# Patient Record
Sex: FEMALE | Race: WHITE | NOT HISPANIC OR LATINO | URBAN - METROPOLITAN AREA
[De-identification: names, ages, dates, MRNs, and addresses within clinical notes are randomized per-mention and may not be internally consistent; named-entity substitution may affect disease eponyms.]

---

## 2017-09-08 PROBLEM — 238136002 MORBID OBESITY: Status: ACTIVE | Noted: 2017-09-08

## 2017-09-08 PROBLEM — 68496003 POLYP OF COLON: Status: ACTIVE | Noted: 2017-09-08

## 2022-04-30 ENCOUNTER — TELEPHONE ENCOUNTER (OUTPATIENT)
Dept: URBAN - METROPOLITAN AREA CLINIC 121 | Facility: CLINIC | Age: 72
End: 2022-04-30

## 2022-05-01 ENCOUNTER — TELEPHONE ENCOUNTER (OUTPATIENT)
Dept: URBAN - METROPOLITAN AREA CLINIC 121 | Facility: CLINIC | Age: 72
End: 2022-05-01

## 2022-05-01 RX ORDER — FLUTICASONE PROPIONATE AND SALMETEROL 50; 250 UG/1; UG/1
POWDER RESPIRATORY (INHALATION)
Status: ACTIVE | COMMUNITY
Start: 2017-09-08

## 2022-05-01 RX ORDER — CETIRIZINE HYDROCHLORIDE 10 MG/1
CAPSULE, LIQUID FILLED ORAL
Status: ACTIVE | COMMUNITY
Start: 2017-09-08

## 2022-05-01 RX ORDER — MONTELUKAST SODIUM 10 MG/1
TABLET, FILM COATED ORAL
Status: ACTIVE | COMMUNITY
Start: 2006-07-14

## 2022-05-01 RX ORDER — EZETIMIBE 10 MG/1
TABLET ORAL
Status: ACTIVE | COMMUNITY
Start: 2017-09-08

## 2022-05-01 RX ORDER — LEVOTHYROXINE SODIUM 150 MCG
TABLET ORAL
Status: ACTIVE | COMMUNITY
Start: 2006-07-14

## 2022-05-01 RX ORDER — PANTOPRAZOLE 20 MG/1
TABLET, DELAYED RELEASE ORAL
Status: ACTIVE | COMMUNITY
Start: 2017-09-08

## 2023-04-03 ENCOUNTER — OFFICE VISIT (OUTPATIENT)
Dept: URBAN - METROPOLITAN AREA CLINIC 27 | Facility: CLINIC | Age: 73
End: 2023-04-03

## 2023-04-12 ENCOUNTER — WEB ENCOUNTER (OUTPATIENT)
Dept: URBAN - METROPOLITAN AREA CLINIC 27 | Facility: CLINIC | Age: 73
End: 2023-04-12

## 2023-04-12 ENCOUNTER — OFFICE VISIT (OUTPATIENT)
Dept: URBAN - METROPOLITAN AREA CLINIC 27 | Facility: CLINIC | Age: 73
End: 2023-04-12
Payer: MEDICARE

## 2023-04-12 ENCOUNTER — DASHBOARD ENCOUNTERS (OUTPATIENT)
Age: 73
End: 2023-04-12

## 2023-04-12 VITALS
HEART RATE: 69 BPM | SYSTOLIC BLOOD PRESSURE: 142 MMHG | RESPIRATION RATE: 17 BRPM | BODY MASS INDEX: 38.64 KG/M2 | HEIGHT: 62 IN | WEIGHT: 210 LBS | DIASTOLIC BLOOD PRESSURE: 72 MMHG

## 2023-04-12 DIAGNOSIS — E11.9 TYPE 2 DIABETES MELLITUS WITHOUT COMPLICATION, UNSPECIFIED WHETHER LONG TERM INSULIN USE: ICD-10-CM

## 2023-04-12 DIAGNOSIS — J45.909 ASTHMA, UNSPECIFIED ASTHMA SEVERITY, UNSPECIFIED WHETHER COMPLICATED, UNSPECIFIED WHETHER PERSISTENT: ICD-10-CM

## 2023-04-12 DIAGNOSIS — Z86.010 PERSONAL HISTORY OF COLONIC POLYPS: ICD-10-CM

## 2023-04-12 PROBLEM — 195967001: Status: ACTIVE | Noted: 2023-04-12

## 2023-04-12 PROBLEM — 313436004: Status: ACTIVE | Noted: 2023-04-12

## 2023-04-12 PROBLEM — 428283002: Status: ACTIVE | Noted: 2023-04-12

## 2023-04-12 PROCEDURE — 99204 OFFICE O/P NEW MOD 45 MIN: CPT | Performed by: INTERNAL MEDICINE

## 2023-04-12 RX ORDER — MONTELUKAST SODIUM 10 MG/1
TABLET, FILM COATED ORAL
Status: ACTIVE | COMMUNITY
Start: 2006-07-14

## 2023-04-12 RX ORDER — EZETIMIBE 10 MG/1
TABLET ORAL
Status: ACTIVE | COMMUNITY
Start: 2017-09-08

## 2023-04-12 RX ORDER — CETIRIZINE HYDROCHLORIDE 10 MG/1
CAPSULE, LIQUID FILLED ORAL
Status: ACTIVE | COMMUNITY
Start: 2017-09-08

## 2023-04-12 RX ORDER — FLUTICASONE PROPIONATE AND SALMETEROL 50; 250 UG/1; UG/1
POWDER RESPIRATORY (INHALATION)
Status: ACTIVE | COMMUNITY
Start: 2017-09-08

## 2023-04-12 RX ORDER — PANTOPRAZOLE 20 MG/1
TABLET, DELAYED RELEASE ORAL
Status: ACTIVE | COMMUNITY
Start: 2017-09-08

## 2023-04-12 RX ORDER — LEVOTHYROXINE SODIUM 150 MCG
TABLET ORAL
Status: ACTIVE | COMMUNITY
Start: 2006-07-14

## 2023-04-12 NOTE — HPI-ZZZTODAY'S VISIT
Ms. Miles is a 73-year-old female patient of Dr. Paniagua presenting for surveillance colonoscopy. She has intermittent loose stools (2-3x/month) which she believes may be related to her metformin. No rectal bleeding. She says thyroid levels are well controlled. She has h/o asthma, was having significant issues spring 2022 but now well controlled; she is on a biologic called Transpire for this, does not use suppelemental O2. She had a presurgical cardic eval 1 yr ago with overtly normal EKG, echo, nuclear stress test. . Colonoscopy 2017: 2 small polyps (1 TA); recall 5 years

## 2023-06-02 ENCOUNTER — CLAIMS CREATED FROM THE CLAIM WINDOW (OUTPATIENT)
Dept: URBAN - METROPOLITAN AREA SURGERY CENTER 7 | Facility: SURGERY CENTER | Age: 73
End: 2023-06-02
Payer: MEDICARE

## 2023-06-02 ENCOUNTER — CLAIMS CREATED FROM THE CLAIM WINDOW (OUTPATIENT)
Dept: URBAN - METROPOLITAN AREA CLINIC 4 | Facility: CLINIC | Age: 73
End: 2023-06-02
Payer: MEDICARE

## 2023-06-02 DIAGNOSIS — D12.4 ADENOMA OF DESCENDING COLON: ICD-10-CM

## 2023-06-02 DIAGNOSIS — K63.89 APPENDICITIS EPIPLOICA: ICD-10-CM

## 2023-06-02 DIAGNOSIS — Z86.010 ADENOMAS PERSONAL HISTORY OF COLONIC POLYPS: ICD-10-CM

## 2023-06-02 DIAGNOSIS — K63.89 OTHER SPECIFIED DISEASES OF INTESTINE: ICD-10-CM

## 2023-06-02 PROCEDURE — G8907 PT DOC NO EVENTS ON DISCHARG: HCPCS | Performed by: INTERNAL MEDICINE

## 2023-06-02 PROCEDURE — 45384 COLONOSCOPY W/LESION REMOVAL: CPT | Performed by: INTERNAL MEDICINE

## 2023-06-02 PROCEDURE — 45385 COLONOSCOPY W/LESION REMOVAL: CPT | Performed by: INTERNAL MEDICINE

## 2023-06-02 PROCEDURE — 88305 TISSUE EXAM BY PATHOLOGIST: CPT | Performed by: PATHOLOGY

## 2023-06-02 RX ORDER — PANTOPRAZOLE 20 MG/1
TABLET, DELAYED RELEASE ORAL
Status: ACTIVE | COMMUNITY
Start: 2017-09-08

## 2023-06-02 RX ORDER — EZETIMIBE 10 MG/1
TABLET ORAL
Status: ACTIVE | COMMUNITY
Start: 2017-09-08

## 2023-06-02 RX ORDER — LEVOTHYROXINE SODIUM 150 MCG
TABLET ORAL
Status: ACTIVE | COMMUNITY
Start: 2006-07-14

## 2023-06-02 RX ORDER — FLUTICASONE PROPIONATE AND SALMETEROL 50; 250 UG/1; UG/1
POWDER RESPIRATORY (INHALATION)
Status: ACTIVE | COMMUNITY
Start: 2017-09-08

## 2023-06-02 RX ORDER — MONTELUKAST SODIUM 10 MG/1
TABLET, FILM COATED ORAL
Status: ACTIVE | COMMUNITY
Start: 2006-07-14

## 2023-06-02 RX ORDER — CETIRIZINE HYDROCHLORIDE 10 MG/1
CAPSULE, LIQUID FILLED ORAL
Status: ACTIVE | COMMUNITY
Start: 2017-09-08

## 2025-01-24 ENCOUNTER — OFFICE VISIT (OUTPATIENT)
Dept: URBAN - METROPOLITAN AREA CLINIC 27 | Facility: CLINIC | Age: 75
End: 2025-01-24
Payer: COMMERCIAL

## 2025-01-24 VITALS
HEIGHT: 62 IN | DIASTOLIC BLOOD PRESSURE: 73 MMHG | BODY MASS INDEX: 37.91 KG/M2 | HEART RATE: 65 BPM | WEIGHT: 206 LBS | SYSTOLIC BLOOD PRESSURE: 122 MMHG

## 2025-01-24 DIAGNOSIS — K21.9 GERD: ICD-10-CM

## 2025-01-24 DIAGNOSIS — K63.5 POLYP OF COLON: ICD-10-CM

## 2025-01-24 DIAGNOSIS — G47.30 SLEEP APNEA, UNSPECIFIED: ICD-10-CM

## 2025-01-24 DIAGNOSIS — E66.3 OVERWEIGHT: ICD-10-CM

## 2025-01-24 DIAGNOSIS — J45.909 UNSPECIFIED ASTHMA, UNCOMPLICATED: ICD-10-CM

## 2025-01-24 DIAGNOSIS — E03.9 HYPOTHYROIDISM, UNSPECIFIED: ICD-10-CM

## 2025-01-24 DIAGNOSIS — Z86.0100 HISTORY OF COLON POLYPS: ICD-10-CM

## 2025-01-24 PROCEDURE — 99214 OFFICE O/P EST MOD 30 MIN: CPT | Performed by: INTERNAL MEDICINE

## 2025-01-24 RX ORDER — SITAGLIPTIN 100 MG/1
TAKE 1 TABLET BY MOUTH EVERY DAY TABLET, FILM COATED ORAL
Qty: 90 EACH | Refills: 0 | Status: ACTIVE | COMMUNITY

## 2025-01-24 RX ORDER — FLUTICASONE PROPIONATE AND SALMETEROL 50; 250 UG/1; UG/1
POWDER RESPIRATORY (INHALATION)
Status: DISCONTINUED | COMMUNITY
Start: 2017-09-08

## 2025-01-24 RX ORDER — LEVOCETIRIZINE DIHYDROCHLORIDE 5 MG/1
1 TABLET IN THE EVENING TABLET, FILM COATED ORAL ONCE A DAY
Status: ACTIVE | COMMUNITY

## 2025-01-24 RX ORDER — CETIRIZINE HYDROCHLORIDE 10 MG/1
CAPSULE, LIQUID FILLED ORAL
Status: DISCONTINUED | COMMUNITY
Start: 2017-09-08

## 2025-01-24 RX ORDER — PANTOPRAZOLE 20 MG/1
TABLET, DELAYED RELEASE ORAL
Status: DISCONTINUED | COMMUNITY
Start: 2017-09-08

## 2025-01-24 RX ORDER — MONTELUKAST SODIUM 10 MG/1
TABLET, FILM COATED ORAL
Status: DISCONTINUED | COMMUNITY
Start: 2006-07-14

## 2025-01-24 RX ORDER — PANTOPRAZOLE SODIUM 40 MG/1
1 TABLET TABLET, DELAYED RELEASE ORAL TWICE DAILY
Status: ACTIVE | COMMUNITY

## 2025-01-24 RX ORDER — METFORMIN HYDROCHLORIDE 500 MG/1
1 TABLET WITH A MEAL TABLET, FILM COATED ORAL ONCE A DAY
Status: ACTIVE | COMMUNITY

## 2025-01-24 RX ORDER — ATORVASTATIN CALCIUM 40 MG/1
1 TABLET TABLET, FILM COATED ORAL ONCE A DAY
Status: ACTIVE | COMMUNITY

## 2025-01-24 RX ORDER — GLIPIZIDE 10 MG/1
TAKE 1 TABLET BY MOUTH TWICE A DAY BEFORE MEALS TABLET ORAL
Qty: 180 EACH | Refills: 0 | Status: ACTIVE | COMMUNITY

## 2025-01-24 RX ORDER — LEVOTHYROXINE SODIUM 125 UG/1
1 CAPSULE IN THE MORNING ON AN EMPTY STOMACH CAPSULE ORAL ONCE A DAY
Status: ACTIVE | COMMUNITY

## 2025-01-24 RX ORDER — TEZEPELUMAB-EKKO 210 MG/1.9ML
AS DIRECTED INJECTION, SOLUTION SUBCUTANEOUS
Status: ACTIVE | COMMUNITY

## 2025-01-24 RX ORDER — EZETIMIBE 10 MG/1
TABLET ORAL
Status: DISCONTINUED | COMMUNITY
Start: 2017-09-08

## 2025-01-24 RX ORDER — LEVOTHYROXINE SODIUM 150 MCG
TABLET ORAL
Status: DISCONTINUED | COMMUNITY
Start: 2006-07-14

## 2025-01-24 NOTE — HPI-TODAY'S VISIT:
This is a 75-year-old female seen in consultation for her reflux.  She is taking Tapazole twice a day.  That has helped her asthma.  She is taking gummy fibers every day.  She last saw her pulmonologist in November.  She notes that sinus drainage also contributes to some of those symptoms.  She was placed on tespire and that has vastly improved her asthma.  She had a colonoscopy in 2023.  Fair prep with tubular adenoma 1 year follow-up was recommended.

## 2025-01-27 ENCOUNTER — LAB OUTSIDE AN ENCOUNTER (OUTPATIENT)
Dept: URBAN - METROPOLITAN AREA CLINIC 27 | Facility: CLINIC | Age: 75
End: 2025-01-27

## 2025-02-11 ENCOUNTER — OFFICE VISIT (OUTPATIENT)
Dept: URBAN - METROPOLITAN AREA MEDICAL CENTER 8 | Facility: MEDICAL CENTER | Age: 75
End: 2025-02-11

## 2025-02-11 RX ORDER — TEZEPELUMAB-EKKO 210 MG/1.9ML
AS DIRECTED INJECTION, SOLUTION SUBCUTANEOUS
Status: ACTIVE | COMMUNITY

## 2025-02-11 RX ORDER — METFORMIN HYDROCHLORIDE 500 MG/1
1 TABLET WITH A MEAL TABLET, FILM COATED ORAL ONCE A DAY
Status: ACTIVE | COMMUNITY

## 2025-02-11 RX ORDER — LEVOTHYROXINE SODIUM 125 UG/1
1 CAPSULE IN THE MORNING ON AN EMPTY STOMACH CAPSULE ORAL ONCE A DAY
Status: ACTIVE | COMMUNITY

## 2025-02-11 RX ORDER — GLIPIZIDE 10 MG/1
TAKE 1 TABLET BY MOUTH TWICE A DAY BEFORE MEALS TABLET ORAL
Qty: 180 EACH | Refills: 0 | Status: ACTIVE | COMMUNITY

## 2025-02-11 RX ORDER — PANTOPRAZOLE SODIUM 40 MG/1
1 TABLET TABLET, DELAYED RELEASE ORAL TWICE DAILY
Status: ACTIVE | COMMUNITY

## 2025-02-11 RX ORDER — ATORVASTATIN CALCIUM 40 MG/1
1 TABLET TABLET, FILM COATED ORAL ONCE A DAY
Status: ACTIVE | COMMUNITY

## 2025-02-11 RX ORDER — SITAGLIPTIN 100 MG/1
TAKE 1 TABLET BY MOUTH EVERY DAY TABLET, FILM COATED ORAL
Qty: 90 EACH | Refills: 0 | Status: ACTIVE | COMMUNITY

## 2025-02-11 RX ORDER — LEVOCETIRIZINE DIHYDROCHLORIDE 5 MG/1
1 TABLET IN THE EVENING TABLET, FILM COATED ORAL ONCE A DAY
Status: ACTIVE | COMMUNITY